# Patient Record
Sex: MALE | Race: WHITE
[De-identification: names, ages, dates, MRNs, and addresses within clinical notes are randomized per-mention and may not be internally consistent; named-entity substitution may affect disease eponyms.]

---

## 2019-04-16 ENCOUNTER — HOSPITAL ENCOUNTER (EMERGENCY)
Dept: HOSPITAL 41 - JD.ED | Age: 64
Discharge: HOME | End: 2019-04-16
Payer: COMMERCIAL

## 2019-04-16 DIAGNOSIS — F45.8: ICD-10-CM

## 2019-04-16 DIAGNOSIS — F41.9: ICD-10-CM

## 2019-04-16 DIAGNOSIS — Z79.899: ICD-10-CM

## 2019-04-16 DIAGNOSIS — R42: Primary | ICD-10-CM

## 2019-04-16 NOTE — CR
Chest:  Two views of the chest were obtained.

 

Comparison: No prior chest x-ray.

 

Heart size and mediastinum are within normal limits.  Lungs are clear.

  Bony structures are unremarkable for patient's age.

 

Impression:

1.  Nothing acute is seen on two-view chest x-ray.

 

Diagnostic code #1

## 2019-04-16 NOTE — EDM.PDOC
ED HPI GENERAL MEDICAL PROBLEM





- General


Chief Complaint: Cardiovascular Problem


Stated Complaint: HIGH BP/DIZZY


Time Seen by Provider: 04/16/19 12:59


Source of Information: Reports: Patient, Family (Wife), RN Notes Reviewed


History Limitations: Reports: No Limitations





- History of Present Illness


INITIAL COMMENTS - FREE TEXT/NARRATIVE: 





The patient states that he developed dizziness, which she means a commendation 

of both lightheadedness and a spinning sensation, when he woke this morning, 

however, his wife corrected him, stating that he began complaining of dizziness 

late Friday night, 4/12/20/9/18, after he went to bed. He states that his 

symptoms are made worse with rapid movements, although not ordinary movements, 

and that he had some difficulty walking down his hallway - that he had to touch 

the wall a few times. He states that he has associated nausea. No associated 

chest pain, palpitations, or dyspnea. No recent headache, sinus pressure, or 

URI symptoms. No recent fever. He denies tinnitus. No decreased hearing in 

either ear. No ear pain in either ear.





The patient states that he checked his blood pressure, and found it to be 

elevated at 161/89. He was concerned that his symptoms were due to his elevated 

blood pressure. He states that he called his PCP, who instructed him to take an 

additional half tablet of his enalapril, however, the patient's wife corrected 

him, stating that he never called her, and that she never told him to do that - 

she states that he did that entirely on his own.





The patient states that he had similar symptoms about 2 years ago, that 

resolved on its own.





Here in the ED, it is noted that the patient's oxygen saturation is 100% on 

room air.





The patient's PCP is Patricia Alcantara.








  ** Headache


Pain Score (Numeric/FACES): 0





- Related Data


 Allergies











Allergy/AdvReac Type Severity Reaction Status Date / Time


 


No Known Allergies Allergy   Verified 04/16/19 12:27











Home Meds: 


 Home Meds





Enalapril Maleate 1 tab PO DAILY 04/16/19 [History]


Meclizine [Antivert] 1 tab PO Q6H PRN #20 tab 04/16/19 [Rx]


Naproxen [Naprosyn] 500 mg PO Q12HR 04/16/19 [History]


Ondansetron [Zofran ODT] 1 tab PO Q8H PRN #10 tab.dis 04/16/19 [Rx]











Past Medical History


HEENT History: Reports: Impaired Vision (wears glasses)


Cardiovascular History: Reports: Hypertension


Genitourinary History: Reports: BPH ( untreated), Renal Calculus


Oncologic (Cancer) History: Reports: Prostate





- Past Surgical History


HEENT Surgical History: Reports: Oral Surgery (wisdom teeth extraction)





Social & Family History





- Family History


Family Medical History: Noncontributory


HEENT: Reports: None


Cardiac: Reports: None


Respiratory: Reports: None


GI: Reports: None


: Reports: None


OBGYN: Reports: None


Musculoskeletal: Reports: None


Neurological: Reports: None


Psychiatric: Reports: None


Endocrine/Metabolic: Reports: Diabetes, type II


Immunologic: Reports: None


Oncologic: Reports: Other (See Below)


Other Oncologic Family History: stomach





- Tobacco Use


Smoking Status *Q: Never Smoker





- Caffeine Use


Caffeine Use: Reports: None





- Alcohol Use


Alcohol Use History: Yes


Alcohol Use Frequency: Rarely





- Recreational Drug Use


Recreational Drug Use: No





- Living Situation & Occupation


Living situation: Reports: , with Spouse


Occupation: Employed ()





ED ROS GENERAL





- Review of Systems


Review Of Systems: ROS reveals no pertinent complaints other than HPI.





ED EXAM, GENERAL





- Physical Exam


Exam: See Below


Exam Limited By: No Limitations


General Appearance: Alert, WD/WN, No Apparent Distress


Eye Exam: Bilateral Eye: EOMI, Normal Inspection, PERRL


Ears: Normal External Exam, Normal Canal, Hearing Grossly Normal, Normal TMs


Nose: Normal Inspection, Normal Mucosa, No Blood


Throat/Mouth: Normal Inspection, Normal Lips, Normal Teeth, Normal Gums, Normal 

Oropharynx, Normal Voice, No Airway Compromise


Head: Atraumatic, Normocephalic


Neck: Normal Inspection, Supple, Non-Tender, Full Range of Motion.  No: 

Lymphadenopathy (L), Lymphadenopathy (R)


Respiratory/Chest: No Respiratory Distress, Lungs Clear, Normal Breath Sounds, 

No Accessory Muscle Use


Cardiovascular: Normal Peripheral Pulses, Regular Rate, Rhythm, No Edema, No 

Gallop, No JVD, No Murmur, No Rub


Peripheral Pulses: 4+: Radial (L), Radial (R)


GI/Abdominal: Normal Bowel Sounds, Soft, Non-Tender, No Organomegaly, No 

Distention, No Abnormal Bruit, No Mass


 (Male) Exam: Deferred


Rectal (Males) Exam: Deferred


Back Exam: Normal Inspection, Full Range of Motion, NT


Extremities: Normal Inspection, Normal Range of Motion, No Pedal Edema, Normal 

Capillary Refill


Neurological: Alert, Oriented, CN II-XII Intact, Normal Cognition, No Motor/

Sensory Deficits, Other (Neither vertigo nor nystagmus were induced with 

bilateral Brenda-Hallpike maneuvers)


Psychiatric: Normal Affect


Skin Exam: Warm, Dry, Intact, Normal Color, No Rash





EKG INTERPRETATION


EKG Date: 04/16/19


Time: 13:56


Rhythm: NSR


Rate (Beats/Min): 66


Axis: Normal


P-Wave: Present


QRS: Normal


ST-T: Normal


QT: Normal


Comparison: NA - No Prior EKG





Course





- Vital Signs


Last Recorded V/S: 


 Last Vital Signs











Temp  36.3 C   04/16/19 12:32


 


Pulse  69   04/16/19 12:32


 


Resp  16   04/16/19 12:32


 


BP  163/92 H  04/16/19 12:32


 


Pulse Ox  100   04/16/19 12:32








 





Orthostatic Blood Pressure [     160/87


Standing]                        


Orthostatic Blood Pressure [     172/85


Sitting]                         


Orthostatic Blood Pressure [     158/84


Supine]                          











- Orders/Labs/Meds


Orders: 


 Active Orders 24 hr











 Category Date Time Status


 


 EKG Documentation Completion [RC] STAT Care  04/16/19 13:31 Active


 


 Orthostatic Vital Signs [RC] STAT Care  04/16/19 13:31 Active











Labs: 


 Laboratory Tests











  04/16/19 04/16/19 04/16/19 Range/Units





  14:25 14:25 14:25 


 


WBC  16.28 H    (4.23-9.07)  K/mm3


 


RBC  5.48    (4.63-6.08)  M/mm3


 


Hgb  15.9    (13.7-17.5)  gm/L


 


Hct  48.4    (40.1-51.0)  %


 


MCV  88.3    (79.0-92.2)  fl


 


MCH  29.0    (25.7-32.2)  pg


 


MCHC  32.9    (32.2-35.5)  g/dl


 


RDW Std Deviation  47.8 H    (35.1-43.9)  fL


 


Plt Count  186    (163-337)  K/mm3


 


MPV  11.2    (9.4-12.3)  fl


 


Neutrophils % (Manual)  88 H    (40-60)  %


 


Band Neutrophils %  1    (0-10)  %


 


Lymphocytes % (Manual)  10 L    (20-40)  %


 


Atypical Lymphs %  0    %


 


Monocytes % (Manual)  1 L    (2-10)  %


 


Eosinophils % (Manual)  0 L    (0.8-7.0)  %


 


Basophils % (Manual)  0 L    (0.2-1.2)  


 


Platelet Estimate  Adequate    


 


RBC Morph Comment  Normal    


 


D-Dimer, Quantitative   0.64 H   (0.19-0.50)  mg/L


 


Sodium    140  (136-145)  mEq/L


 


Potassium    4.4  (3.5-5.1)  mEq/L


 


Chloride    106  ()  mEq/L


 


Carbon Dioxide    25  (21-32)  mEq/L


 


Anion Gap    13.4  (5-15)  


 


BUN    17  (7-18)  mg/dL


 


Creatinine    1.1  (0.7-1.3)  mg/dL


 


Est Cr Clr Drug Dosing    74.46  mL/min


 


Estimated GFR (MDRD)    > 60  (>60)  mL/min


 


BUN/Creatinine Ratio    15.5  (14-18)  


 


Glucose    112  ()  mg/dL


 


Calcium    9.7  (8.5-10.1)  mg/dL


 


Magnesium    2.0  (1.8-2.4)  mg/dl


 


Total Bilirubin    0.5  (0.2-1.0)  mg/dL


 


AST    31  (15-37)  U/L


 


ALT    38  (16-63)  U/L


 


Alkaline Phosphatase    46  ()  U/L


 


Troponin I    < 0.017  (0.00-0.056)  ng/mL


 


Total Protein    7.2  (6.4-8.2)  g/dl


 


Albumin    3.7  (3.4-5.0)  g/dl


 


Globulin    3.5  gm/dL


 


Albumin/Globulin Ratio    1.1  (1-2)  


 


TSH 3rd Generation    1.413  (0.358-3.74)  uIU/mL














- Re-Assessments/Exams


Free Text/Narrative Re-Assessment/Exam: 





04/16/19 13:33


It is not entirely clear if the dizziness that the patient describes is vertigo 

or lightheadedness. He states that his symptoms worsen with rapid movements, 

and that he had at least a little bit of difficulty walking in a straight line 

down his hallway, however, his [place]-Hallpike maneuver is completely negative, 

despite the patient stating that his symptoms are still present. The patient's 

oxygen saturation is noted to be 100% on room air, indicating that he is 

hyperventilating, which could be responsible for his lightheadedness. As per 

the HPI, he denies recent chest pain, palpitations, or dyspnea, therefore a PE 

or AMI is highly unlikely, nevertheless, I have ordered a workup to rule out 

medical causes of hyperventilation, that includes blood work, a chest x-ray, 

orthostatics, and an ECG.








04/16/19 14:19


The patient is not orthostatic.





2-view chest radiograph appears to be grossly normal. The cardiac silhouette is 

within normal limits. No pulmonary vascular congestion. No pleural effusions. 

No focal infiltrate. No pneumothorax. Formal read per the Radiologist pending.








04/16/19 15:59


Test results discussed with the patient and his wife. Today's workup is 

unremarkable, and does not explain why the patient might be hyperventilating. 

By a process of elimination, I suspect it is most likely related to anxiety, 

and I suspect that the patient's presenting symptom of dizziness is likely 

related to anxiety and hyperventilation. I advised the patient that if his 

symptoms persist, that he contact his PCP to discuss treatment options for 

anxiety. On the off-chance that his symptoms are due to genuine BPPV, I will 

prescribe both meclizine and Zofran, and refer the patient to ENT.





Departure





- Departure


Time of Disposition: 16:01


Disposition: Home, Self-Care 01


Condition: Good


Clinical Impression: 


 Dizziness, Hyperventilation syndrome, Anxiety








- Discharge Information


*PRESCRIPTION DRUG MONITORING PROGRAM REVIEWED*: Not Applicable


*COPY OF PRESCRIPTION DRUG MONITORING REPORT IN PATIENT PUNEET: Not Applicable


Prescriptions: 


Meclizine [Antivert] 1 tab PO Q6H PRN #20 tab


 PRN Reason: Dizziness


Ondansetron [Zofran ODT] 1 tab PO Q8H PRN #10 tab.dis


 PRN Reason: Nausea/Vomiting


Instructions:  Hyperventilation, Living With Anxiety


Referrals: 


Patricia Alcantara NP [Primary Care Provider] - 


Jeet Pelaez MD [Ordering Only Provider] - 


Forms:  ED Department Discharge


Additional Instructions: 


You were seen in the emergency room for dizziness since Friday evening, 4/12/ 2019.





Workup in the ER included blood work, positional blood pressure checks, a chest 

x-ray, and an ECG.





Your oxygen saturation was found to be 100% on room air, consistent with 

hyperventilation. Hyperventilation is usually caused by anxiety, but can be 

caused by medical problems, as well. A workup to evaluate the cause of your 

hyperventilation was performed, and returned unremarkable. You do not have an 

infection, significant electrolyte abnormalities, a heart attack, a blood clot 

in your lungs, or hyperthyroidism. You are not dehydrated. You do not have 

pneumonia. No abnormal rhythms were found on your EKG or telemetry monitor.





Based on your history, physical exam, and ER tests, the cause of your 

hyperventilation is most likely do to anxiety, and anxiety is most likely the 

cause of your dizziness, as no nystagmus was able to be induced during the [place]-

Hallpike maneuver.





If your symptoms persist, we recommend that you follow-up with your PCP, Patricia Alcantara, to discuss treatment options for anxiety.





Alternatively, you can follow-up with the ENT Dr. Jeet Pelaez, in [place], for 

further evaluation to see if your dizziness is due to benign paroxysmal 

positional vertigo (BPPV).





Prescriptions for the anti-vertigo medicine meclizine and the anti-nausea 

medicine Zofran have been sent to the [facility], located in the [facility] grocery store.





Take one tablet of meclizine up to every 6 hours as needed for dizziness. If 

you take meclizine, do not drive for 10 hours afterwards, as meclizine can make 

you tired.





Dissolve one tablet of Zofran on your tongue up to every 8 hours, as needed for 

nausea/vomiting.





If any other problems, please do not hesitate to return to the ER.





- My Orders


Last 24 Hours: 


My Active Orders





04/16/19 13:31


EKG Documentation Completion [RC] STAT 


Orthostatic Vital Signs [RC] STAT 














- Assessment/Plan


Last 24 Hours: 


My Active Orders





04/16/19 13:31


EKG Documentation Completion [RC] STAT 


Orthostatic Vital Signs [RC] STAT

## 2021-11-04 NOTE — PCM.PRNOTE
- Free Text/Narrative


Note: 





Date: 11/4/2021


Procedure: screening colonoscopy


History: average risk for colon cancer, normal colonoscopy 10 years ago


Endoscopist: Kai Patterson MD





Findings: excellent prep. Ileocecal valve well visualized. Three small 

subcentimeter polyps identified and removed with cold forceps. Sigmoid 

diverticulosis. Small, benign-feeling cystic lesion adjacent to anus on right 

side. 





Detailed Report:





The patient was taken to the endoscopy suite and placed in left lateral 

decubitus position. Time out was performed and monitored anesthesia care 

initiated. There appeared to be an area of fullness at the right lateral aspect 

of the anal verge. On palpation, a small subcutaneous mobile nodule or cyst was 

palpable. Prostate felt to be surgically absent. Digital exam otherwise 

unremarkable. The colonoscope was inserted and advanced to the cecum. The 

ileocecal valve was well visualized. The prep was very good. The scope was 

slowly withdrawn and mucosal surfaces carefully inspected. Three subcentimeter 

polyps were identified and removed using cold forceps. Sigmoid diverticulosis 

was noted. No pathology was noted within the rectum on retroflexion. Air was 

suctioned from the distal colon and rectum prior to withdrawal of the scope. The

patient tolerated the procedure well.

## 2021-11-04 NOTE — [id number]
Post Anesthesia Note





- EVALUATION WITHIN 48HRS OF ANESTHETIC


Vital Signs in Normal Range: Yes


Patient Participated in Evaluation: Yes


Respiratory Function Stable: Yes


Airway Patent: Yes


Cardiovascular Function Stable: Yes


Hydration Status Stable: Yes


Pain Control Satisfactory: Yes


Nausea and Vomiting Control Satisfactory: Yes


Mental Status Recovered: Yes


Vital Signs: 


122/84


94%


79


14


98.1

## 2021-11-04 NOTE — PCM.PREANE
Preanesthetic Assessment





- Procedure


Proposed Procedure: 





screening colonoscopy





- Anesthesia/Transfusion/Family Hx


Anesthesia History: Prior Anesthesia Reaction


Type of Anesthesia Reaction: Excessive Somnolence


Family History of Anesthesia Reaction: No


Transfusion History: No Prior Transfusion(s)





- Review of Systems


General: No Symptoms


Pulmonary: No Symptoms


Cardiovascular: No Symptoms


Gastrointestinal: No Symptoms


Neurological: No Symptoms


Other: Reports: None





- Physical Assessment


NPO Status Date: 11/04/21


NPO Status Time: 04:00 (prep)


Vital Signs: 





145/86


76


99%


98.1


16


Height: 6 ft


Weight: 90 kg


ASA Class: 2


Mental Status: Alert & Oriented x3


Airway Class: Mallampati = 1


Dentition: Reports: Normal Dentition, Crown(s)


Thyro-Mental Finger Breadths: 3


Mouth Opening Finger Breadths: 3


ROM/Head Extension: Full


Lungs: Clear to Auscultation, Normal Respiratory Effort


Cardiovascular: Regular Rate, Regular Rhythm





- Allergies


Allergies/Adverse Reactions: 


                                    Allergies











Allergy/AdvReac Type Severity Reaction Status Date / Time


 


No Known Allergies Allergy   Verified 11/03/21 11:51














- Blood


Blood Available: No





- Acknowledgements


Anesthesia Type Planned: MAC


Pt an Appropriate Candidate for the Planned Anesthesia: Yes


Alternatives and Risks of Anesthesia Discussed w Pt/Guardian: Yes


Pt/Guardian Understands and Agrees with Anesthesia Plan: Yes





PreAnesthesia Questionnaire





- Past Health History


Medical/Surgical History: Denies Medical/Surgical History


HEENT History: Reports: Other (See Below)


Other HEENT History: cerumen impaction


Cardiovascular History: Reports: High Cholesterol, Hypertension


Respiratory History: Reports: Other (See Below)


Other Respiratory History: cough


Gastrointestinal History: Reports: Other (See Below)


Other Gastrointestinal History: left lower quadrant pain


Genitourinary History: Reports: Renal Calculus


Other Genitourinary History: elevated PSA 9.6


OB/GYN History: Reports: None


Musculoskeletal History: Reports: Other (See Below)


Other Musculoskeletal History: right leg pain, sciatic pain


Neurological History: Reports: None


Other Neuro History: dizziness


Psychiatric History: Reports: None


Endocrine/Metabolic History: Reports: None


Hematologic History: Reports: Other (See Below)


Other Hematologic History: elevated potassium


Immunologic History: Reports: None


Oncologic (Cancer) History: Reports: Prostate


Other Oncologic History: newly dx


Dermatologic History: Reports: None





- Infectious Disease History


Infectious Disease History: Reports: None





- Past Surgical History


Head Surgeries/Procedures: Reports: None


HEENT Surgical History: Reports: Other (See Below) (skin spots on ear)


Cardiovascular Surgical History: Reports: None


Respiratory Surgical History: Reports: None


GI Surgical History: Reports: Colonoscopy


Female  Surgical History: Reports: None


Male  Surgical History: Reports: None, TURP-Transurethral Resection of 

Prostate


Endocrine Surgical History: Reports: None


Neurological Surgical History: Reports: None


Oncologic Surgical History: Reports: Other (See Below)


Other Oncologic Surgeries/Procedures: prostate biopsy


Dermatological Surgical History: Reports: None





- SUBSTANCE USE


Tobacco Use Status *Q: Never Tobacco User


Tobacco Use Within Last Twelve Months: No


Second Hand Smoke Exposure: No


Days Per Week of Alcohol Use: 0


Recreational Drug Use History: No





- HOME MEDS


Home Medications: 


                                    Home Meds





Enalapril Maleate 5 mg PO DAILY 04/16/19 [History]


Ascorbic Acid [Vitamin C] 1,000 mg PO DAILY 11/03/21 [History]


Cholecalciferol (Vitamin D3) [Vitamin D3] 5,000 unit PO DAILY 11/03/21 [History]


Multivitamin 1 tab PO DAILY 11/03/21 [History]


Zinc 50 mg PO DAILY 11/03/21 [History]











- CURRENT (IN HOUSE) MEDS


Current Meds: 





                               Current Medications





Lactated Ringer's (Ringers, Lactated)  1,000 mls @ 125 mls/hr IV ASDIRECTED DEEDEE


Lidocaine/Sodium Bicarbonate (Lidocaine 1%/Sod Bicarbonate In Ns 8.4% 1 Ml 

Syringe)  0.25 ml IDERM ONETIME PRN


   PRN Reason: Prior to IV Start


Sodium Chloride (Sodium Chloride 0.9% 10 Ml Syringe)  10 ml FLUSH ASDIRECTED PRN


   PRN Reason: Keep Vein Open





Discontinued Medications





Fentanyl (Fentanyl 100 Mcg/2 Ml Sdv) Confirm Administered Dose 100 mcg .ROUTE 

.STK-MED ONE


   Stop: 11/04/21 07:05


Lidocaine HCl (Xylocaine-Mpf 1%) Confirm Administered Dose 4 mls @ as directed 

.ROUTE .STK-MED ONE


   Stop: 11/04/21 07:05


Midazolam HCl (Midazolam 1 Mg/Ml 2 Ml Sdv) Confirm Administered Dose 2 mg .ROUTE

 .STK-MED ONE


   Stop: 11/04/21 07:05


Propofol (Propofol 200 Mg/20 Ml Sdv) Confirm Administered Dose 200 mg .ROUTE 

.STK-MED ONE


   Stop: 11/04/21 07:05